# Patient Record
Sex: MALE | Race: BLACK OR AFRICAN AMERICAN | Employment: OTHER | URBAN - METROPOLITAN AREA
[De-identification: names, ages, dates, MRNs, and addresses within clinical notes are randomized per-mention and may not be internally consistent; named-entity substitution may affect disease eponyms.]

---

## 2021-02-05 ENCOUNTER — OFFICE VISIT (OUTPATIENT)
Dept: ENT CLINIC | Age: 61
End: 2021-02-05
Payer: OTHER GOVERNMENT

## 2021-02-05 VITALS
HEIGHT: 70 IN | RESPIRATION RATE: 18 BRPM | WEIGHT: 291 LBS | DIASTOLIC BLOOD PRESSURE: 90 MMHG | HEART RATE: 67 BPM | SYSTOLIC BLOOD PRESSURE: 140 MMHG | TEMPERATURE: 97.1 F | BODY MASS INDEX: 41.66 KG/M2 | OXYGEN SATURATION: 98 %

## 2021-02-05 DIAGNOSIS — R43.2 DYSGEUSIA: ICD-10-CM

## 2021-02-05 DIAGNOSIS — R43.0 ANOSMIA: Primary | ICD-10-CM

## 2021-02-05 DIAGNOSIS — Z86.16 HISTORY OF COVID-19: ICD-10-CM

## 2021-02-05 PROCEDURE — 99204 OFFICE O/P NEW MOD 45 MIN: CPT | Performed by: OTOLARYNGOLOGY

## 2021-02-05 RX ORDER — ATORVASTATIN CALCIUM 80 MG/1
80 TABLET, FILM COATED ORAL DAILY
COMMUNITY

## 2021-02-05 NOTE — PROGRESS NOTES
Subjective:    Flaquito Ibarra   56 y.o.   1960     New Patient Visit    Location - nose, throat    Quality - anosmia, poor taste    Severity -  Moderate/severe    Duration - 8-9 months    Timing - chronic    Context - pt with sudden onset anosmia and dysgeusia in May 2020 - he was dx with COVID - recovered at home, was not given any treatment no steroids; he did have some short lasting recovery in June, now can taste some lemonade and some salt but nothing else    Modifying Features - none    Associated symptoms/signs - no congestion drainage or pre-existing sinus disease      Review of Systems  Review of Systems   Constitutional: Negative for chills and fever. HENT: Negative for ear pain, hearing loss, nosebleeds and tinnitus. Eyes: Negative for blurred vision and double vision. Respiratory: Negative for cough, sputum production and shortness of breath. Cardiovascular: Negative for chest pain and palpitations. Gastrointestinal: Negative for heartburn, nausea and vomiting. Musculoskeletal: Negative for joint pain and neck pain. Skin: Negative. Neurological: Negative for dizziness, speech change, weakness and headaches. Endo/Heme/Allergies: Negative for environmental allergies. Does not bruise/bleed easily. Psychiatric/Behavioral: Negative for memory loss. The patient does not have insomnia. Past Medical History:   Diagnosis Date    High cholesterol      Past Surgical History:   Procedure Laterality Date    HX APPENDECTOMY        History reviewed. No pertinent family history. Social History     Tobacco Use    Smoking status: Never Smoker    Smokeless tobacco: Never Used   Substance Use Topics    Alcohol use: Not on file      Prior to Admission medications    Medication Sig Start Date End Date Taking? Authorizing Provider   atorvastatin (LIPITOR) 80 mg tablet Take 80 mg by mouth daily.    Yes Provider, Historical        No Known Allergies      Objective:     Visit Vitals  BP (!) 140/90 (BP 1 Location: Left upper arm, BP Patient Position: Sitting, BP Cuff Size: Adult)   Pulse 67   Temp 97.1 °F (36.2 °C) (Oral)   Resp 18   Ht 5' 10\" (1.778 m)   Wt 291 lb (132 kg)   SpO2 98%   BMI 41.75 kg/m²        Physical Exam  Vitals signs reviewed. Constitutional:       General: He is awake. Appearance: Normal appearance. He is obese. HENT:      Head: Normocephalic and atraumatic. Jaw: There is normal jaw occlusion. No trismus, tenderness or malocclusion. Salivary Glands: Right salivary gland is not diffusely enlarged or tender. Left salivary gland is not diffusely enlarged or tender. Right Ear: Hearing, tympanic membrane, ear canal and external ear normal.      Left Ear: Hearing, tympanic membrane, ear canal and external ear normal.      Ears:      Clayton exam findings: does not lateralize. Right Rinne: AC > BC. Left Rinne: AC > BC. Nose: No septal deviation, mucosal edema or rhinorrhea. Right Turbinates: Enlarged. Not swollen or pale. Left Turbinates: Enlarged. Not swollen or pale. Right Sinus: No maxillary sinus tenderness or frontal sinus tenderness. Left Sinus: No maxillary sinus tenderness or frontal sinus tenderness. Mouth/Throat:      Lips: Pink. Mouth: Mucous membranes are moist. No oral lesions. Dentition: Normal dentition. No gum lesions. Tongue: No lesions. Palate: No mass and lesions. Pharynx: Oropharynx is clear. Uvula midline. Tonsils: No tonsillar exudate. 0 on the right. 0 on the left. Eyes:      General: Vision grossly intact. Extraocular Movements: Extraocular movements intact. Right eye: No nystagmus. Left eye: No nystagmus. Pupils: Pupils are equal, round, and reactive to light. Neck:      Musculoskeletal: Normal range of motion. No edema or erythema. Thyroid: No thyroid mass, thyromegaly or thyroid tenderness.       Trachea: Trachea and phonation normal. No tracheal tenderness. Cardiovascular:      Rate and Rhythm: Normal rate and regular rhythm. Pulmonary:      Effort: Pulmonary effort is normal.      Breath sounds: Normal breath sounds. No stridor. No wheezing. Musculoskeletal: Normal range of motion. Lymphadenopathy:      Cervical: No cervical adenopathy. Skin:     General: Skin is warm and dry. Neurological:      General: No focal deficit present. Mental Status: He is alert and oriented to person, place, and time. Mental status is at baseline. Cranial Nerves: Cranial nerves are intact. Coordination: Romberg sign negative. Gait: Gait is intact. Comments: Negative Hallpike   Psychiatric:         Mood and Affect: Mood normal.         Behavior: Behavior normal. Behavior is cooperative. Assessment/Plan:     Encounter Diagnoses   Name Primary?  Anosmia Yes    Dysgeusia     History of COVID-19      Exam clear  We discussed essential oils therapy he will purchase a kit and start  Also recommend CT, will call w results    Orders Placed This Encounter    CT MAXILLOFACIAL WO CONT    atorvastatin (LIPITOR) 80 mg tablet           Thank you for referring this patient to:    Terrance Nolasco MD, [address]  [address] [place] [address] [address]  [facility] [phone]

## 2021-02-05 NOTE — LETTER
2/5/2021 Patient: Naomy Marsh YOB: 1960 Date of Visit: 2/5/2021 Milford Libman, MD 
[facility] [place] [id number] Via Fax: [phone] Dear Milford Libman, MD, Thank you for referring Mr. Concepción Husain to [address] [place] [address], NOSE, TH[facility] ALLER[facility] for evaluation. My notes for this consultation are attached. If you have questions, please do not hesitate to call me. I look forward to following your patient along with you. Sincerely, Braden Romero MD

## 2021-02-25 ENCOUNTER — HOSPITAL ENCOUNTER (OUTPATIENT)
Dept: CT IMAGING | Age: 61
Discharge: HOME | End: 2021-02-25
Attending: OTOLARYNGOLOGY
Payer: OTHER GOVERNMENT

## 2021-02-25 DIAGNOSIS — R43.0 ANOSMIA: ICD-10-CM

## 2021-02-25 PROCEDURE — 70486 CT MAXILLOFACIAL W/O DYE: CPT

## 2021-03-01 NOTE — PROGRESS NOTES
Pls notify patient - I reviewed his CT scan - all is normal.  I would like him to continue the essential oil therapy and followup with me in 3 months.

## 2022-03-19 PROBLEM — R43.2 DYSGEUSIA: Status: ACTIVE | Noted: 2021-02-05

## 2022-03-19 PROBLEM — Z86.16 HISTORY OF COVID-19: Status: ACTIVE | Noted: 2021-02-05

## 2022-03-20 PROBLEM — R43.0 ANOSMIA: Status: ACTIVE | Noted: 2021-02-05

## 2025-08-21 ENCOUNTER — TELEPHONE (OUTPATIENT)
Dept: FAMILY MEDICINE CLINIC | Facility: CLINIC | Age: 65
End: 2025-08-21